# Patient Record
(demographics unavailable — no encounter records)

---

## 2025-05-01 NOTE — HISTORY OF PRESENT ILLNESS
[de-identified] : Patient here for bilat knees Patient states she was running while in the [employer] and started to feel pain progress on 4/25/25. Patient states right is worse than left. Patient states pain is sharp and throbbing in the medial aspects of both knees with weight bearing, ROM and impact related activities. Patient came into office ambulating on her own in slippers.  [] : Post Surgical Visit: no [FreeTextEntry1] : Bilat knees  [FreeTextEntry3] : 4/25/2025 [FreeTextEntry5] : Patient states she was running while in the [employer] and started to feel pain  [de-identified] : Movement  [de-identified] : Training for

## 2025-05-01 NOTE — PHYSICAL EXAM
[de-identified] : Examination of the right and left knees is as follows: INSPECTION: no effusion, no ecchymosis, no erythema, no atrophy, no deformities of quad tendon or of patellar tendon PALPATION: paterllar tenderness, but no palpable mass, no obvious defects, no increased warmth, no calf tenderness ROM: flexion 125 degrees, but extension 0 degrees STRENGTH: quadriceps 5/5, hamstring 5/5 TESTING: ligamentously stable NEURO: light touch is intact throughout GAIT: antalgic, but patient ambulates without assistive device  X-rays of the right and left knees is as follows:  Knee 3 view in the anteroposterior, lateral, skyline views: no acute osseous abnormalities

## 2025-05-01 NOTE — ASSESSMENT
[FreeTextEntry1] : 23yoF with bilateral patellar tendonitis.  Recommend nonsurgical management.  -work note given today -Activities as tolerated -Rest, ice, compression, elevation, NSAIDs PRN for pain. -All questions answered -F/u prn   The diagnosis was explained in detail. The potential non-surgical and surgical treatments were reviewed. The relative risks and benefits of each option were considered relative to the patients age, activity level, medical history, symptom severity and previously attempted treatments.   The patient was advised to consult with their primary medical provider prior to initiation of any new medications to reduce the risk of adverse effects specific to their long-term home medications and medical history.    The patient's current medications management of their orthopedic diagnosis was reviewed today:   1) We discussed a comprehensive treatment plan that included possible pharmaceutical management involving the use of prescription strength medications including but not limited to options as oral Naprosyn 500mg BID, once daily Meloxicam 15 mg, or 500-650 mg Tylenol versus over the counter oral medications and topical prescriptions NSAID Pennsaid vs over the counter Voltaren gel.  2) There is a moderate risk of morbidity with further treatment, especially from use of prescription strength medications and possible side effects of these medications which include upset stomach with oral medications, skin reactions to topical medications and cardiac/renal issues with long term use.  3) I recommended that hte patient follow-up with their medical physician to discuss any significant specific potential issues with long term medication use such as interactions with current medications or with exacerbation of underlying medical comorbidities.  4) The benefits and risks associated with use of injectable, oral or topical, prescription and over the counter anti-inflammatory medications were discussed with the patient. The patient voiced understanding of the risks including but not limited to bleeding, stroke, kidney dysfunction, heart disease, and were referred to the black box warning label for further information